# Patient Record
Sex: MALE | Race: BLACK OR AFRICAN AMERICAN | NOT HISPANIC OR LATINO | URBAN - METROPOLITAN AREA
[De-identification: names, ages, dates, MRNs, and addresses within clinical notes are randomized per-mention and may not be internally consistent; named-entity substitution may affect disease eponyms.]

---

## 2019-10-01 ENCOUNTER — HOSPITAL ENCOUNTER (EMERGENCY)
Facility: HOSPITAL | Age: 44
Discharge: LEFT BEFORE DISCHARGE | End: 2019-10-01
Attending: FAMILY MEDICINE

## 2019-10-01 VITALS
HEART RATE: 103 BPM | DIASTOLIC BLOOD PRESSURE: 102 MMHG | WEIGHT: 212 LBS | HEIGHT: 72 IN | RESPIRATION RATE: 20 BRPM | BODY MASS INDEX: 28.71 KG/M2 | OXYGEN SATURATION: 100 % | SYSTOLIC BLOOD PRESSURE: 174 MMHG | TEMPERATURE: 98 F

## 2019-10-01 DIAGNOSIS — K52.9 GASTROENTERITIS: Primary | ICD-10-CM

## 2019-10-01 DIAGNOSIS — R52 PAIN: ICD-10-CM

## 2019-10-01 LAB
ALBUMIN SERPL BCP-MCNC: 3.9 G/DL (ref 3.5–5.2)
ALP SERPL-CCNC: 69 U/L (ref 38–126)
ALT SERPL W/O P-5'-P-CCNC: 12 U/L (ref 10–44)
ANION GAP SERPL CALC-SCNC: 6 MMOL/L (ref 8–16)
AST SERPL-CCNC: 15 U/L (ref 15–46)
BASOPHILS # BLD AUTO: 0.04 K/UL (ref 0–0.2)
BASOPHILS NFR BLD: 0.9 % (ref 0–1.9)
BILIRUB SERPL-MCNC: 0.4 MG/DL (ref 0.1–1)
BUN SERPL-MCNC: 6 MG/DL (ref 2–20)
CALCIUM SERPL-MCNC: 8.9 MG/DL (ref 8.7–10.5)
CHLORIDE SERPL-SCNC: 109 MMOL/L (ref 95–110)
CO2 SERPL-SCNC: 24 MMOL/L (ref 23–29)
CREAT SERPL-MCNC: 0.94 MG/DL (ref 0.5–1.4)
DIFFERENTIAL METHOD: ABNORMAL
EOSINOPHIL # BLD AUTO: 0.2 K/UL (ref 0–0.5)
EOSINOPHIL NFR BLD: 3.9 % (ref 0–8)
ERYTHROCYTE [DISTWIDTH] IN BLOOD BY AUTOMATED COUNT: 15.1 % (ref 11.5–14.5)
EST. GFR  (AFRICAN AMERICAN): >60 ML/MIN/1.73 M^2
EST. GFR  (NON AFRICAN AMERICAN): >60 ML/MIN/1.73 M^2
GLUCOSE SERPL-MCNC: 87 MG/DL (ref 70–110)
HCT VFR BLD AUTO: 32.8 % (ref 40–54)
HGB BLD-MCNC: 10.6 G/DL (ref 14–18)
LYMPHOCYTES # BLD AUTO: 0.8 K/UL (ref 1–4.8)
LYMPHOCYTES NFR BLD: 18.3 % (ref 18–48)
MCH RBC QN AUTO: 24.1 PG (ref 27–31)
MCHC RBC AUTO-ENTMCNC: 32.3 G/DL (ref 32–36)
MCV RBC AUTO: 75 FL (ref 82–98)
MONOCYTES # BLD AUTO: 0.4 K/UL (ref 0.3–1)
MONOCYTES NFR BLD: 9.6 % (ref 4–15)
NEUTROPHILS # BLD AUTO: 2.9 K/UL (ref 1.8–7.7)
NEUTROPHILS NFR BLD: 67.3 % (ref 38–73)
PLATELET # BLD AUTO: 307 K/UL (ref 150–350)
PMV BLD AUTO: 10 FL (ref 9.2–12.9)
POTASSIUM SERPL-SCNC: 4.1 MMOL/L (ref 3.5–5.1)
PROT SERPL-MCNC: 6.7 G/DL (ref 6–8.4)
RBC # BLD AUTO: 4.39 M/UL (ref 4.6–6.2)
SODIUM SERPL-SCNC: 139 MMOL/L (ref 136–145)
WBC # BLD AUTO: 4.36 K/UL (ref 3.9–12.7)

## 2019-10-01 PROCEDURE — 99283 EMERGENCY DEPT VISIT LOW MDM: CPT | Mod: 25,ER

## 2019-10-01 PROCEDURE — 85025 COMPLETE CBC W/AUTO DIFF WBC: CPT | Mod: ER

## 2019-10-01 PROCEDURE — 80053 COMPREHEN METABOLIC PANEL: CPT | Mod: ER

## 2019-10-01 RX ORDER — DICYCLOMINE HYDROCHLORIDE 10 MG/ML
20 INJECTION INTRAMUSCULAR
Status: DISCONTINUED | OUTPATIENT
Start: 2019-10-01 | End: 2019-10-01 | Stop reason: HOSPADM

## 2019-10-01 RX ORDER — ONDANSETRON 2 MG/ML
4 INJECTION INTRAMUSCULAR; INTRAVENOUS
Status: DISCONTINUED | OUTPATIENT
Start: 2019-10-01 | End: 2019-10-01 | Stop reason: HOSPADM

## 2019-10-01 RX ORDER — CARVEDILOL 12.5 MG/1
12.5 TABLET ORAL 2 TIMES DAILY WITH MEALS
COMMUNITY

## 2019-10-01 NOTE — ED NOTES
"Went in pts room when he returned from radiology to administer his medication and collect his urine specimen. When explaining to pt what medication I was going to administer he stated "you know what, I am just gonna go home." When asked why he wanted to leave he stated "I just want to go, take this s**t out of me." IV removed from pt and pt informed to hold on while I notify Dr. Chapin and get him to sign an AMA form. Pt informed of risks associated with leaving and informed that he could return for medical evaluation at any time. Pt verbalized understanding.  "

## 2019-10-01 NOTE — LETTER
Patient: Skip Perez  YOB: 1975  Date: 10/1/2019 Time: 2:07 PM  Location: [facility]    Leaving the [facility] Against Medical Advice    Chart #:[id number]    This will certify that I, the undersigned,    ______________________________________________________________________    A patient in the above named [facility] center, having requested discharge and removal from the medical [place] against the advice of my attending physician(s), hereby release [facility], its physicians, officers and employees, severally and individually, from any and all liability of any nature whatsoever for any injury or harm or complication of any kind that may result directly or indirectly, by reason of my terminating my stay as a patient at [facility] and my departure from [facility], and hereby waive any and all rights of action I may now have or later acquire as a result of my voluntary departure from [facility] and the termination of my stay as a patient therein.    This release is made with the full knowledge of the danger that may result from the action which I am taking.      Date:_______________________                         ___________________________                                                                                    Patient/Legal Representative    Witness:        ____________________________                          ___________________________  Nurse                                                                        Physician

## 2019-10-01 NOTE — ED PROVIDER NOTES
Encounter Date: 10/1/2019       History     Chief Complaint   Patient presents with    Abdominal Pain     Left lower abdominal pain that started at 0700. PT reports NVD. Denies urinary symptoms     44-year-old male complains of nausea, vomiting and diarrhea since yesterday.  He had several episodes of vomiting and diarrhea.  Complains of left lower quadrant pain. Denies blood in stool or vomiting. Denies fever.  Left arm has bruising possibly secondary to blood draw.  Patient says he was at [place] and is difficult to draw blood that is why it is bruised.  He also had chest lead sticker area with residual glue.    PATIENT WENT TO X-RAY.  X-RAY TECH OS PATIENT IF HE HAD ANY CONTRAST, PATIENT DENIED.  PATIENT CLAIMS HE DRANK ONLY MAG CITRATE.      The history is provided by the patient.     Review of patient's allergies indicates:   Allergen Reactions    Nsaids (non-steroidal anti-inflammatory drug) Hives     Past Medical History:   Diagnosis Date    Hypertension      Past Surgical History:   Procedure Laterality Date    CHOLECYSTECTOMY      LAPAROSCOPIC GASTRIC BANDING       History reviewed. No pertinent family history.  Social History     Tobacco Use    Smoking status: Never Smoker    Smokeless tobacco: Never Used   Substance Use Topics    Alcohol use: Never     Frequency: Never    Drug use: Never     Review of Systems   Constitutional: Negative for activity change, appetite change, chills and fever.   HENT: Negative for congestion, sinus pressure and sore throat.    Eyes: Negative for pain, discharge and itching.   Respiratory: Negative for cough, shortness of breath and wheezing.    Cardiovascular: Negative for chest pain and palpitations.   Gastrointestinal: Positive for abdominal pain, diarrhea, nausea and vomiting. Negative for blood in stool.   Musculoskeletal: Negative for back pain, gait problem, neck pain and neck stiffness.   Skin: Negative for rash.   Neurological: Negative for  dizziness, light-headedness, numbness and headaches.   Psychiatric/Behavioral: Negative for behavioral problems, confusion and decreased concentration. The patient is not nervous/anxious.    All other systems reviewed and are negative.      Physical Exam     Initial Vitals [10/01/19 1128]   BP Pulse Resp Temp SpO2   (!) 174/102 103 20 97.7 °F (36.5 °C) 100 %      MAP       --         Physical Exam    Nursing note and vitals reviewed.  Constitutional: He appears well-developed and well-nourished.   HENT:   Head: Normocephalic.   Mouth/Throat: Oropharynx is clear and moist.   Eyes: EOM are normal. Pupils are equal, round, and reactive to light.   Cardiovascular: Normal rate, regular rhythm, normal heart sounds and intact distal pulses.   Pulmonary/Chest: Effort normal and breath sounds normal.   Abdominal: Soft. Normal appearance and bowel sounds are normal. He exhibits no distension and no ascites. There is tenderness in the left lower quadrant. There is guarding. There is no rigidity, no rebound, no CVA tenderness, no tenderness at McBurney's point and negative Dotson's sign. No hernia.       Neurological: He is alert and oriented to person, place, and time.   Skin: Skin is warm. Capillary refill takes less than 2 seconds.   Psychiatric: He has a normal mood and affect. His behavior is normal.         ED Course   Procedures  Labs Reviewed   COMPREHENSIVE METABOLIC PANEL   CBC W/ AUTO DIFFERENTIAL   URINALYSIS, REFLEX TO URINE CULTURE          Imaging Results          X-Ray Abdomen Flat And Erect (Final result)  Result time 10/01/19 12:06:56    Final result by CLARI Rocha Sr., MD (10/01/19 12:06:56)                 Impression:      1. There is radiopaque material within the distal portion of the gastrointestinal system.  There are no dilated loops of bowel visualized.  2. There is a gastric lap band visualized.  3. There is a minimal amount of levoconvex curvature of the lumbar spine.      Electronically signed  by: Yonathan Rocha MD  Date:    10/01/2019  Time:    12:06             Narrative:    EXAMINATION:  XR ABDOMEN FLAT AND ERECT    CLINICAL HISTORY:  Pain, unspecified    COMPARISON:  None    FINDINGS:  There is radiopaque material within the distal portion of the gastrointestinal system.  There are no dilated loops of bowel visualized.  There is a gastric lap band visualized.  There is no pneumoperitoneum.  There is a minimal amount of levoconvex curvature of the lumbar spine.                                 Medical Decision Making:   Initial Assessment:   PATIENT IS COMES TO ED FOR EVALUATION OF NAUSEA, VOMITING, DIARRHEA AND ABDOMINAL PAIN.    Differential Diagnosis:   GASTROENTERITIS, DEHYDRATION, INTESTINAL OBSTRUCTION, DIVERTICULITIS  ED Management:  X-RAY OF ABDOMEN FLAT AND ERECT HAS BEEN ORDERED.  WHEN HE COMES BACK FROM X-RAY, X-RAY TECH [facility] NOTIFIES ME THAT PATIENT HAS CONTRAST IN HIS BELLY.  WHEN HE ASKED ABOUT CONTRAST PATIENT DENIED TAKING ANY CONTRAST AND CLAIMS HE TOOK MAG CITRATE.  PATIENT HAS BEEN ASKING FOR PAIN AND NAUSEA MEDICATION FOR WHICH I PRESCRIBED ZOFRAN AND BENTYL.  AS SOON AS HE CAME FROM X-RAY PATIENT ASKED WHAT HE IS GETTING AND DECLINED.  HE WANTED TO LEAVE AMA.  HE SIGNED AMA AND LEFT IMMEDIATELY.  I AM VERY SUSPICIOUS IF PATIENT WAS SEEN AT A DIFFERENT FACILITY AND HAD COMPLETE EVALUATION AND IS NOT DISCLOSING ANY OF THOSE DETAILS.                      Clinical Impression:       ICD-10-CM ICD-9-CM   1. Gastroenteritis K52.9 558.9   2. Pain R52 780.96         Disposition:   Disposition: AMA  Condition: Graeme Chapin MD  10/01/19 1820